# Patient Record
Sex: FEMALE | Race: WHITE | HISPANIC OR LATINO | Employment: FULL TIME | URBAN - METROPOLITAN AREA
[De-identification: names, ages, dates, MRNs, and addresses within clinical notes are randomized per-mention and may not be internally consistent; named-entity substitution may affect disease eponyms.]

---

## 2024-09-19 ENCOUNTER — APPOINTMENT (OUTPATIENT)
Dept: RADIOLOGY | Facility: CLINIC | Age: 29
End: 2024-09-19
Attending: EMERGENCY MEDICINE

## 2024-09-19 ENCOUNTER — HOSPITAL ENCOUNTER (EMERGENCY)
Facility: CLINIC | Age: 29
Discharge: HOME | End: 2024-09-19

## 2024-09-19 VITALS
BODY MASS INDEX: 36.82 KG/M2 | TEMPERATURE: 98.6 F | DIASTOLIC BLOOD PRESSURE: 63 MMHG | OXYGEN SATURATION: 99 % | SYSTOLIC BLOOD PRESSURE: 128 MMHG | HEIGHT: 61 IN | HEART RATE: 69 BPM | RESPIRATION RATE: 18 BRPM | WEIGHT: 195 LBS

## 2024-09-19 DIAGNOSIS — M79.645 PAIN OF FINGER OF LEFT HAND: ICD-10-CM

## 2024-09-19 PROCEDURE — 99283 EMERGENCY DEPT VISIT LOW MDM: CPT | Mod: 25

## 2024-09-19 PROCEDURE — 29130 APPL FINGER SPLINT STATIC: CPT | Mod: F4

## 2024-09-19 PROCEDURE — 73130 X-RAY EXAM OF HAND: CPT | Mod: LT

## 2024-09-19 ASSESSMENT — COLUMBIA-SUICIDE SEVERITY RATING SCALE - C-SSRS
2. HAVE YOU ACTUALLY HAD ANY THOUGHTS OF KILLING YOURSELF IN THE PAST MONTH?: NO
6. HAVE YOU EVER DONE ANYTHING, STARTED TO DO ANYTHING, OR PREPARED TO DO ANYTHING TO END YOUR LIFE?: NO
1. IN THE PAST MONTH, HAVE YOU WISHED YOU WERE DEAD OR WISHED YOU COULD GO TO SLEEP AND NOT WAKE UP?: NO

## 2024-09-19 NOTE — Clinical Note
Janett Hill was seen and treated in our emergency department on 9/19/2024.  She may return to work on 09/23/2024.       If you have any questions or concerns, please don't hesitate to call.      Rosemarie Borjas, DANIEL

## 2024-09-19 NOTE — ED PROVIDER NOTES
EMERGENCY DEPARTMENT ENCOUNTER      NAME: Janett Hill  AGE: 29 year old female  YOB: 1995  MRN: [id number]  EVALUATION DATE & TIME: No admission date for patient encounter.    PCP: No primary care provider on file.    ED PROVIDER: Beatriz Kincaid PA-C    Chief Complaint   Patient presents with    Hand Injury     FINAL IMPRESSION:  1. Pain of finger of left hand      ED COURSE & MEDICAL DECISION MAKING:    Pertinent Labs & Imaging studies reviewed. (See chart for details)  29 year old female presents to the Emergency Department for evaluation of an injury.  Just prior to arrival while at work patient's left pinky finger got caught between 2 boxes.  Patient immediately applied ice to her finger.She denies any other hand injury. She denies taking any ibuprofen or Tylenol.  Vital signs reviewed and unremarkable.  Afebrile.  On exam, patient is tender to palpation over the distal aspect of her left pinky finger.  Remainder of the left upper extremity is nontender to palpation. No snuffbox tenderness. No wrist pain. No hand pain. Normal range of motion, sensation and strength of the left upper extremity.  No overlying erythema, edema, ecchymosis.  No lacerations or abrasions.  Normal warmth.  Normal capillary refill.  Pulses are 2+ bilaterally.    Differential diagnosis includes fracture, contusion, sprain.  Offered pain medication but declined.  Hand x-ray shows normal joint spaces and alignment.  No fracture.  Mild widening of the scaphoid lunate interval.  Patient will rest, ice and elevate her hand.  Patient was given a metal finger splint to provide support. We discussed a thumb spika due to patients imaging results but patient declined as she has no snuffbox, wrist or hand pain. Patient will be referred to [facility] to discuss her hand injury and results Patient will return to the ED if new symptoms develop or symptoms worsen.  Patient follow-up with her primary care doctor  discuss her ED visit.  All questions answered.   Patient was educated on results and agrees with plan.       ED COURSE:   5:04 PM I saw the patient.   5:41 PM patient was educated on results.  Patient will be discharged home.  Patient agrees with plan.  All questions answered.        At the conclusion of the encounter I discussed the results of all of the tests and the disposition. The questions were answered. The patient or family acknowledged understanding and was agreeable with the care plan.     0 minutes of critical care time       Medical Decision Making  Obtained supplemental history:Supplemental history obtained?: No  Reviewed external records: External records reviewed?: No  Care impacted by chronic illness:N/A  Care significantly affected by social determinants of health:N/A  Did you consider but not order tests?: Work up considered but not performed and documented in chart, if applicable  Did you interpret images independently?: Independent interpretation of ECG and images noted in documentation, when applicable.  Consultation discussion with other provider:Did you involve another provider (consultant, MH, pharmacy, etc.)?: No  Discharge. No recommendations on prescription strength medication(s). See documentation for any additional details.    Not Applicable      MEDICATIONS GIVEN IN THE EMERGENCY:  Medications - No data to display    NEW PRESCRIPTIONS STARTED AT TODAY'S ER VISIT  New Prescriptions    No medications on file          =================================================================    HPI    Patient information was obtained from: patient    Use of : Refused professional .  Patient wanted to use her own phone .        Janett Hill is a 29 year old female with no pertinent history who presents to this ED via EMS for evaluation of hand injury.    Patient reports she was at work today when at 3 PM, her left pinky was smushed between 2 boxes. She  "hasn't taken any tylenol or ibuprofen PTA. She is still able to move the finger. No other hand injury. There were no other concerns/complaints at this time.      REVIEW OF SYSTEMS   As per HPI    PAST MEDICAL HISTORY:  History reviewed. No pertinent past medical history.    PAST SURGICAL HISTORY:  History reviewed. No pertinent surgical history.    CURRENT MEDICATIONS:    No current outpatient medications on file.      ALLERGIES:  No Known Allergies    FAMILY HISTORY:  History reviewed. No pertinent family history.    SOCIAL HISTORY:        VITALS:  /63   Pulse 73   Temp 98.6  F (37  C) (Temporal)   Resp 18   Ht 1.54 m (5' 0.63\")   Wt 88.5 kg (195 lb)   LMP 08/12/2024 (Approximate)   SpO2 100%   BMI 37.30 kg/m      PHYSICAL EXAM    Physical Exam  Vitals and nursing note reviewed.   Constitutional:       Appearance: Normal appearance. She is not ill-appearing, toxic-appearing or diaphoretic.   HENT:      Head: Atraumatic.      Right Ear: External ear normal.      Left Ear: External ear normal.      Nose: Nose normal.      Mouth/Throat:      Mouth: Mucous membranes are moist.   Eyes:      Conjunctiva/sclera: Conjunctivae normal.      Pupils: Pupils are equal, round, and reactive to light.   Cardiovascular:      Rate and Rhythm: Normal rate and regular rhythm.      Pulses: Normal pulses.      Heart sounds: Normal heart sounds. No murmur heard.     No friction rub. No gallop.   Pulmonary:      Effort: Pulmonary effort is normal.      Breath sounds: Normal breath sounds. No wheezing or rales.   Abdominal:      Tenderness: There is no abdominal tenderness. There is no guarding or rebound.   Musculoskeletal:      Cervical back: Normal range of motion.      Comments: Distal left pinky finger is tender to palpation.  Remainder of the left upper extremity is nontender to palpation.  No overlying erythema, edema, ecchymosis.  No lacerations or abrasions.  Normal warmth.  Pulses are 2+ bilaterally.  Normal " capillary refill.  Normal range of motion, sensation and strength of the left upper extremity.   Skin:     General: Skin is dry.   Neurological:      Mental Status: She is alert. Mental status is at baseline.   Psychiatric:         Mood and Affect: Mood normal.         Thought Content: Thought content normal.        LAB:  All pertinent labs reviewed and interpreted.  Labs Ordered and Resulted from Time of ED Arrival to Time of ED Departure - No data to display     RADIOLOGY:  Reviewed all pertinent imaging. Please see official radiology report.  XR Hand Left G/E 3 Views   Final Result   IMPRESSION: Normal joint spaces and alignment. No fracture. Mild widening of scapholunate interval.          I, Mendy Marquez, am serving as a scribe to document services personally performed by Beatriz Kincaid PA-C, based on my observation and the provider's statements to me. I, Beatriz Kincaid PA-C, attest that Mendy Marquez is acting in a scribe capacity, has observed my performance of the services and has documented them in accordance with my direction.    Beatriz Kincaid PA-C  [facility] EMERGENCY ROOM  [address] [id number]  [phone]     Beatriz Kincaid PA-C  09/20/24 [id number]

## 2024-09-19 NOTE — DISCHARGE INSTRUCTIONS
Rest.  Orally hydrate.  Your x-ray did not show a fracture.  I have provided you a metal splint to wear over your finger for protection.  Ice and rest your finger.  Return to the ED if new symptoms develop or symptoms worsen.  Follow-up with your primary care doctor discuss your ED visit.    Follow up with [facility] to discuss ED visit.